# Patient Record
Sex: MALE | Race: WHITE | URBAN - METROPOLITAN AREA
[De-identification: names, ages, dates, MRNs, and addresses within clinical notes are randomized per-mention and may not be internally consistent; named-entity substitution may affect disease eponyms.]

---

## 2017-02-07 ENCOUNTER — OFFICE VISIT (OUTPATIENT)
Dept: DERMATOLOGY | Age: 77
End: 2017-02-07

## 2017-02-07 DIAGNOSIS — D48.5 NEOPLASM OF UNCERTAIN BEHAVIOR OF SKIN: ICD-10-CM

## 2017-02-07 DIAGNOSIS — L57.0 AK (ACTINIC KERATOSIS): Primary | ICD-10-CM

## 2017-02-07 PROCEDURE — 11100 PR BIOPSY OF SKIN LESION: CPT | Performed by: DERMATOLOGY

## 2017-02-07 PROCEDURE — 17003 DESTRUCT PREMALG LES 2-14: CPT | Performed by: DERMATOLOGY

## 2017-02-07 PROCEDURE — 17000 DESTRUCT PREMALG LESION: CPT | Performed by: DERMATOLOGY

## 2017-02-07 PROCEDURE — 1036F TOBACCO NON-USER: CPT | Performed by: DERMATOLOGY

## 2017-02-10 ENCOUNTER — TELEPHONE (OUTPATIENT)
Dept: DERMATOLOGY | Age: 77
End: 2017-02-10

## 2017-03-08 ENCOUNTER — OFFICE VISIT (OUTPATIENT)
Dept: DERMATOLOGY | Age: 77
End: 2017-03-08

## 2017-03-08 DIAGNOSIS — C44.612 BCC (BASAL CELL CARCINOMA), ARM, RIGHT: Primary | ICD-10-CM

## 2017-03-08 PROCEDURE — 17261 DSTRJ MAL LES T/A/L .6-1.0CM: CPT | Performed by: DERMATOLOGY

## 2017-03-08 PROCEDURE — 1036F TOBACCO NON-USER: CPT | Performed by: DERMATOLOGY

## 2017-08-09 ENCOUNTER — OFFICE VISIT (OUTPATIENT)
Dept: DERMATOLOGY | Age: 77
End: 2017-08-09

## 2017-08-09 DIAGNOSIS — Z85.828 HISTORY OF BASAL CELL CARCINOMA: ICD-10-CM

## 2017-08-09 DIAGNOSIS — L57.0 AK (ACTINIC KERATOSIS): ICD-10-CM

## 2017-08-09 DIAGNOSIS — S60.451A FOREIGN BODY OF LEFT INDEX FINGER: Primary | ICD-10-CM

## 2017-08-09 PROCEDURE — 3017F COLORECTAL CA SCREEN DOC REV: CPT | Performed by: DERMATOLOGY

## 2017-08-09 PROCEDURE — G8427 DOCREV CUR MEDS BY ELIG CLIN: HCPCS | Performed by: DERMATOLOGY

## 2017-08-09 PROCEDURE — 4040F PNEUMOC VAC/ADMIN/RCVD: CPT | Performed by: DERMATOLOGY

## 2017-08-09 PROCEDURE — 1036F TOBACCO NON-USER: CPT | Performed by: DERMATOLOGY

## 2017-08-09 PROCEDURE — 10120 INC&RMVL FB SUBQ TISS SMPL: CPT | Performed by: DERMATOLOGY

## 2017-08-09 PROCEDURE — 17000 DESTRUCT PREMALG LESION: CPT | Performed by: DERMATOLOGY

## 2017-08-09 PROCEDURE — 17003 DESTRUCT PREMALG LES 2-14: CPT | Performed by: DERMATOLOGY

## 2017-08-09 PROCEDURE — G8421 BMI NOT CALCULATED: HCPCS | Performed by: DERMATOLOGY

## 2017-08-09 PROCEDURE — 1123F ACP DISCUSS/DSCN MKR DOCD: CPT | Performed by: DERMATOLOGY

## 2017-08-09 PROCEDURE — 99213 OFFICE O/P EST LOW 20 MIN: CPT | Performed by: DERMATOLOGY

## 2018-02-12 ENCOUNTER — OFFICE VISIT (OUTPATIENT)
Dept: DERMATOLOGY | Age: 78
End: 2018-02-12

## 2018-02-12 DIAGNOSIS — L57.0 AK (ACTINIC KERATOSIS): Primary | ICD-10-CM

## 2018-02-12 DIAGNOSIS — Z85.828 HISTORY OF BASAL CELL CARCINOMA: ICD-10-CM

## 2018-02-12 DIAGNOSIS — D22.9 MULTIPLE NEVI: ICD-10-CM

## 2018-02-12 PROCEDURE — G8484 FLU IMMUNIZE NO ADMIN: HCPCS | Performed by: DERMATOLOGY

## 2018-02-12 PROCEDURE — G8421 BMI NOT CALCULATED: HCPCS | Performed by: DERMATOLOGY

## 2018-02-12 PROCEDURE — 4040F PNEUMOC VAC/ADMIN/RCVD: CPT | Performed by: DERMATOLOGY

## 2018-02-12 PROCEDURE — 17003 DESTRUCT PREMALG LES 2-14: CPT | Performed by: DERMATOLOGY

## 2018-02-12 PROCEDURE — 1123F ACP DISCUSS/DSCN MKR DOCD: CPT | Performed by: DERMATOLOGY

## 2018-02-12 PROCEDURE — G8427 DOCREV CUR MEDS BY ELIG CLIN: HCPCS | Performed by: DERMATOLOGY

## 2018-02-12 PROCEDURE — 3017F COLORECTAL CA SCREEN DOC REV: CPT | Performed by: DERMATOLOGY

## 2018-02-12 PROCEDURE — 17000 DESTRUCT PREMALG LESION: CPT | Performed by: DERMATOLOGY

## 2018-02-12 PROCEDURE — 1036F TOBACCO NON-USER: CPT | Performed by: DERMATOLOGY

## 2018-02-12 PROCEDURE — 99213 OFFICE O/P EST LOW 20 MIN: CPT | Performed by: DERMATOLOGY

## 2018-02-12 NOTE — PROGRESS NOTES
[facility]ermat[facility]  Kym Chapman MD  [phone]      Kristen Daft  1940    78 y.o. male     Date of Visit: 2/12/2018    Chief Complaint: skin lesions    History of Present Illness:    1. He complains of a rough scaly lesion on the forehead. He has a hx of AKs. 2.  He has multiple nevi and a hx of dysplastic nevus on the left flank - not aware of any changes in size, color or shape. 3.  He has a hx of BCCs most recently 1 year ago. Denies any signs of recurrence. Derm History:  History of AK'streated with Efudex on the left cheek/temple and ears and 2016.     Nodular BCC on the right distal extensor forearmtreated with curettage on 3/8/2017. Nodular BCC left lateral neckexcised on 9/30/2016.     Dysplastic nevus with moderate dysplasia on the left flank      Review of Systems:  Gen: Feels well, good sense of health. Skin: No new or changing moles, no history of keloids or hypertrophic scars. Heme: No abnormal bruising or bleeding. Past Medical History, Family History, Surgical History, Medications and Allergies reviewed.     Past Medical History:   Diagnosis Date    Hyperlipidemia     Hypertension      Past Surgical History:   Procedure Laterality Date    KNEE SURGERY         No Known Allergies  Outpatient Prescriptions Marked as Taking for the 2/12/18 encounter (Office Visit) with Davie Siddiqui MD   Medication Sig Dispense Refill    atorvastatin (LIPITOR) 20 MG tablet       losartan (COZAAR) 100 MG tablet       amLODIPine (NORVASC) 10 MG tablet Take 10 mg by mouth daily      omeprazole (PRILOSEC) 20 MG capsule Take 20 mg by mouth daily      vitamin D (CHOLECALCIFEROL) 1000 UNIT TABS tablet Take 1,000 Units by mouth daily      aspirin 81 MG chewable tablet Take 81 mg by mouth daily           Physical Examination       The following were examined and determined to be normal: Psych/Neuro, Scalp/hair, Conjunctivae/eyelids, Gums/teeth/lips, Neck,

## 2018-09-24 ENCOUNTER — OFFICE VISIT (OUTPATIENT)
Dept: DERMATOLOGY | Age: 78
End: 2018-09-24
Payer: MEDICARE

## 2018-09-24 DIAGNOSIS — D48.5 NEOPLASM OF UNCERTAIN BEHAVIOR OF SKIN: ICD-10-CM

## 2018-09-24 DIAGNOSIS — L57.0 AK (ACTINIC KERATOSIS): Primary | ICD-10-CM

## 2018-09-24 PROCEDURE — 11100 PR BIOPSY OF SKIN LESION: CPT | Performed by: DERMATOLOGY

## 2018-09-24 PROCEDURE — 17003 DESTRUCT PREMALG LES 2-14: CPT | Performed by: DERMATOLOGY

## 2018-09-24 PROCEDURE — 17000 DESTRUCT PREMALG LESION: CPT | Performed by: DERMATOLOGY

## 2018-09-24 NOTE — PROGRESS NOTES
appearing. 1.  Right central cheek - 2, right nasal tip - 1, right nasal sidewall - 1, right nasal ala - 1, dorsum of the R hand - 1: ill defined irreg shaped keratotic pink macules. 2.  Left upper abdomen - 5 mm variegated brown macule. Assessment and Plan     1. AK (actinic keratosis)     2 cycles of liquid nitrogen applied to 6 AKs: Right central cheek - 2, right nasal tip - 1, right nasal sidewall - 1, right nasal ala - 1, dorsum of the R hand - 1. Patient was educated regarding the potential risks of blister formation, discomfort, hypopigmentation, and scar. Wound care was discussed. 2. Neoplasm of uncertain behavior of skin, left upper abdomen - dysplastic nevus vs early melanoma    Discussed possible diagnosis; patient agreeable to biopsy (verbal consent obtained). The area(s) to be biopsied were marked with a surgical pen. Alcohol was used to cleanse the site. Local anesthesia was acheived with 1% lidocaine with epinephrine. Shave biopsy was performed using a razor blade. Hemostasis was achieved with aluminum chloride. The wound(s) were dressed with petrolatum and covered with a bandage. Wound care instructions were reviewed. 1 Specimen (s) sent to pathology. The specimen bottles were appropriately labeled. We also reviewed the risks of bleeding, scar, and infection. Return in about 6 months (around 3/24/2019).

## 2018-09-26 LAB — DERMATOLOGY PATHOLOGY REPORT: NORMAL

## 2019-03-28 ENCOUNTER — OFFICE VISIT (OUTPATIENT)
Dept: DERMATOLOGY | Age: 79
End: 2019-03-28
Payer: MEDICARE

## 2019-03-28 DIAGNOSIS — L57.0 ACTINIC KERATOSIS: ICD-10-CM

## 2019-03-28 DIAGNOSIS — L82.1 SEBORRHEIC KERATOSIS: ICD-10-CM

## 2019-03-28 DIAGNOSIS — L72.0 EPIDERMOID CYST: Primary | ICD-10-CM

## 2019-03-28 DIAGNOSIS — D22.9 MULTIPLE NEVI: ICD-10-CM

## 2019-03-28 PROCEDURE — 1123F ACP DISCUSS/DSCN MKR DOCD: CPT | Performed by: DERMATOLOGY

## 2019-03-28 PROCEDURE — 17000 DESTRUCT PREMALG LESION: CPT | Performed by: DERMATOLOGY

## 2019-03-28 PROCEDURE — 99213 OFFICE O/P EST LOW 20 MIN: CPT | Performed by: DERMATOLOGY

## 2019-03-28 PROCEDURE — G8484 FLU IMMUNIZE NO ADMIN: HCPCS | Performed by: DERMATOLOGY

## 2019-03-28 PROCEDURE — 4040F PNEUMOC VAC/ADMIN/RCVD: CPT | Performed by: DERMATOLOGY

## 2019-03-28 PROCEDURE — G8427 DOCREV CUR MEDS BY ELIG CLIN: HCPCS | Performed by: DERMATOLOGY

## 2019-03-28 PROCEDURE — 1036F TOBACCO NON-USER: CPT | Performed by: DERMATOLOGY

## 2019-03-28 PROCEDURE — G8421 BMI NOT CALCULATED: HCPCS | Performed by: DERMATOLOGY

## 2019-03-28 PROCEDURE — 17003 DESTRUCT PREMALG LES 2-14: CPT | Performed by: DERMATOLOGY

## 2019-10-01 ENCOUNTER — OFFICE VISIT (OUTPATIENT)
Dept: DERMATOLOGY | Age: 79
End: 2019-10-01
Payer: MEDICARE

## 2019-10-01 DIAGNOSIS — D48.5 NEOPLASM OF UNCERTAIN BEHAVIOR OF SKIN: Primary | ICD-10-CM

## 2019-10-01 DIAGNOSIS — L57.0 ACTINIC KERATOSIS: ICD-10-CM

## 2019-10-01 DIAGNOSIS — D22.9 MULTIPLE NEVI: ICD-10-CM

## 2019-10-01 DIAGNOSIS — Z85.828 HISTORY OF BASAL CELL CARCINOMA: ICD-10-CM

## 2019-10-01 PROCEDURE — 99213 OFFICE O/P EST LOW 20 MIN: CPT | Performed by: DERMATOLOGY

## 2019-10-01 PROCEDURE — G8421 BMI NOT CALCULATED: HCPCS | Performed by: DERMATOLOGY

## 2019-10-01 PROCEDURE — 11102 TANGNTL BX SKIN SINGLE LES: CPT | Performed by: DERMATOLOGY

## 2019-10-01 PROCEDURE — G8484 FLU IMMUNIZE NO ADMIN: HCPCS | Performed by: DERMATOLOGY

## 2019-10-01 PROCEDURE — G8427 DOCREV CUR MEDS BY ELIG CLIN: HCPCS | Performed by: DERMATOLOGY

## 2019-10-01 PROCEDURE — 1036F TOBACCO NON-USER: CPT | Performed by: DERMATOLOGY

## 2019-10-01 PROCEDURE — 4040F PNEUMOC VAC/ADMIN/RCVD: CPT | Performed by: DERMATOLOGY

## 2019-10-01 PROCEDURE — 1123F ACP DISCUSS/DSCN MKR DOCD: CPT | Performed by: DERMATOLOGY

## 2019-10-01 RX ORDER — METOPROLOL SUCCINATE 50 MG/1
50 TABLET, EXTENDED RELEASE ORAL
COMMUNITY
Start: 2018-12-20

## 2019-10-03 LAB — DERMATOLOGY PATHOLOGY REPORT: ABNORMAL

## 2019-11-01 ENCOUNTER — PROCEDURE VISIT (OUTPATIENT)
Dept: DERMATOLOGY | Age: 79
End: 2019-11-01
Payer: MEDICARE

## 2019-11-01 DIAGNOSIS — C44.41 BASAL CELL CARCINOMA (BCC) OF SCALP: Primary | ICD-10-CM

## 2019-11-01 PROCEDURE — 17270 DSTR MAL LES S/N/H/F/G .5 /<: CPT | Performed by: DERMATOLOGY

## 2020-09-01 ENCOUNTER — OFFICE VISIT (OUTPATIENT)
Dept: DERMATOLOGY | Age: 80
End: 2020-09-01
Payer: MEDICARE

## 2020-09-01 PROCEDURE — 4040F PNEUMOC VAC/ADMIN/RCVD: CPT | Performed by: DERMATOLOGY

## 2020-09-01 PROCEDURE — 17000 DESTRUCT PREMALG LESION: CPT | Performed by: DERMATOLOGY

## 2020-09-01 PROCEDURE — 1123F ACP DISCUSS/DSCN MKR DOCD: CPT | Performed by: DERMATOLOGY

## 2020-09-01 PROCEDURE — 99213 OFFICE O/P EST LOW 20 MIN: CPT | Performed by: DERMATOLOGY

## 2020-09-01 PROCEDURE — G8427 DOCREV CUR MEDS BY ELIG CLIN: HCPCS | Performed by: DERMATOLOGY

## 2020-09-01 PROCEDURE — 17003 DESTRUCT PREMALG LES 2-14: CPT | Performed by: DERMATOLOGY

## 2020-09-01 PROCEDURE — 1036F TOBACCO NON-USER: CPT | Performed by: DERMATOLOGY

## 2020-09-01 PROCEDURE — G8421 BMI NOT CALCULATED: HCPCS | Performed by: DERMATOLOGY

## 2020-09-01 NOTE — PROGRESS NOTES
[facility] Dermat[facility]  Altagracia Rangel MD  [phone]      Unique Issa  1940    68 y.o. male     Date of Visit: 9/1/2020    Chief Complaint: skin lesions    History of Present Illness:    1. Follow-up for history of actinic keratoses-complains of a couple of persistent scaly lesions on the nose. 2.  He also complains of 2 persistent lesions on the right forearm. 3.  Complains of an asymptomatic lesion on the right upper back. 4.  He has multiple nevi on the trunk and extremities-not aware of any changes in size, color, or shape. 5.  He has a history of BCCs-denies any signs of recurrence. Superficial BCC on the central occipital scalp-treated with curettage on 11/1/2019. Nodular BCC on the right distal extensor forearm-treated with curettage on 3/8/2017. Nodular BCC left lateral neck-excised on 9/30/2016. Derm History:  History of AK's-treated with Efudex on the left cheek/temple and ears and 2016.     Moderately dysplastic nevus - left upper abdomen, 9/24/18  Dysplastic nevus with moderate dysplasia on the left flank      On Eliquis. Review of Systems:  Skin: No rash or other concerning skin lesions. Past Medical History, Family History, Surgical History, Medications and Allergies reviewed.     Past Medical History:   Diagnosis Date    A-fib ([address]     Hyperlipidemia     Hypertension      Past Surgical History:   Procedure Laterality Date    KNEE SURGERY         No Known Allergies  Outpatient Medications Marked as Taking for the 9/1/20 encounter (Office Visit) with Rocio Matos MD   Medication Sig Dispense Refill    Boswellia-Glucosamine-Vit D (GLUCOSAMINE COMPLEX PO) Take by mouth      metoprolol succinate (TOPROL XL) 50 MG extended release tablet Take 50 mg by mouth      apixaban (ELIQUIS) 5 MG TABS tablet Take 5 mg by mouth      atorvastatin (LIPITOR) 20 MG tablet       losartan (COZAAR) 100 MG tablet       vitamin D (CHOLECALCIFEROL) 1000 UNIT TABS tablet Take 1,000 Units by mouth daily           Physical Examination       The following were examined and determined to be normal: Psych/Neuro, Scalp/hair, Conjunctivae/eyelids, Gums/teeth/lips, Neck, Breast/axilla/chest, Abdomen, Back, RUE and LUE. The following were examined and determined to be abnormal: Head/face. Well-appearing. 1.  Nasal tip with 3 keratotic erythematous macules. 2.  Right forearm with 2 stuck on appearing verrucous pink-brown papules. 3.  Right upper back with a small round skin colored nodule with overlying punctum. 4.  Scattered on the trunk and extremities are multiple well-defined round oval uniformly brown macules and few papules. 5.  Clear. Assessment and Plan     1. AK (actinic keratosis) - 3    2 cycles of liquid nitrogen applied to 3 AKs on the nose. Patient was educated regarding the potential risks of blister formation, discomfort, hypopigmentation, and scar. Wound care was discussed. 2. SK (seborrheic keratosis)     Reassurance. 3. Epidermoid cyst on the back - small and asymptomatic    Reassurance. 4. Multiple nevi - benign appearing    Sun protective behaviors and self skin examinations were encouraged. Call for any new or concerning lesions. 5. History of basal cell carcinomas - clear    Sun protective behaviors and self skin examinations were encouraged. Call for any new or concerning lesions. Return in about 6 months (around 3/1/2021).

## 2020-12-28 ENCOUNTER — TELEPHONE (OUTPATIENT)
Dept: DERMATOLOGY | Age: 80
End: 2020-12-28

## 2020-12-28 NOTE — TELEPHONE ENCOUNTER
The patient called about dry scaly skin on back that comes back every winter, it itches very bad. He also has a problem with a cyst on back that Dr. Sandrine San has seen before. It is bothering him and is burning and he is ready to schedule treatment. Please call with advise/scheduling for both issues.      Ph. [phone] [facility] in [place]

## 2020-12-28 NOTE — TELEPHONE ENCOUNTER
Patient scheduled for cyst removal.     Previous treatment included triamcinolone and if any suggestions for daily moisturizer?

## 2020-12-29 RX ORDER — TRIAMCINOLONE ACETONIDE 1 MG/G
CREAM TOPICAL
Qty: 80 G | Refills: 0 | Status: SHIPPED | OUTPATIENT
Start: 2020-12-29

## 2020-12-29 NOTE — TELEPHONE ENCOUNTER
I can send in order for triamcinolone to use twice per day to any itchy areas until improved. For a daily moisturizer: Cetaphil or CeraVe cream (in the jar) or Eucerin advanced repair (in the jar).      Thanks,    [facility]

## 2021-01-29 ENCOUNTER — PROCEDURE VISIT (OUTPATIENT)
Dept: DERMATOLOGY | Age: 81
End: 2021-01-29
Payer: MEDICARE

## 2021-01-29 VITALS — TEMPERATURE: 97.2 F

## 2021-01-29 DIAGNOSIS — L72.0 EPIDERMOID CYST: Primary | ICD-10-CM

## 2021-01-29 DIAGNOSIS — R20.9 DISTURBANCE OF SKIN SENSATION: ICD-10-CM

## 2021-01-29 PROCEDURE — 11401 EXC TR-EXT B9+MARG 0.6-1 CM: CPT | Performed by: DERMATOLOGY

## 2021-01-29 NOTE — PROGRESS NOTES
[facility]  Marya Wong MD  [phone]      Kenji Been  1940    68 y.o. male     Date of Visit: 1/29/2021    Chief Complaint: cyst    History of Present Illness:    He presents today for removal of a cyst on the right upper back that is painful. He reports that he experiences the pain mainly after golfing. On Eliquis. Review of Systems:  Gen: Feels well, good sense of health. Heme: No abnormal bruising or bleeding. Past Medical History, Family History, Surgical History, Medications and Allergies reviewed. Past Medical History:   Diagnosis Date    [address] [address]     Hyperlipidemia     Hypertension      Past Surgical History:   Procedure Laterality Date    KNEE SURGERY         No Known Allergies  Outpatient Medications Marked as Taking for the 1/29/21 encounter (Procedure visit) with Anna Joseph MD   Medication Sig Dispense Refill    triamcinolone (KENALOG) 0.1 % cream Apply topically 2 times daily for up to 2 weeks or until improved. 80 g 0    Boswellia-Glucosamine-Vit D (GLUCOSAMINE COMPLEX PO) Take by mouth      metoprolol succinate (TOPROL XL) 50 MG extended release tablet Take 50 mg by mouth      apixaban (ELIQUIS) 5 MG TABS tablet Take 5 mg by mouth      atorvastatin (LIPITOR) 20 MG tablet       losartan (COZAAR) 100 MG tablet       vitamin D (CHOLECALCIFEROL) 1000 UNIT TABS tablet Take 1,000 Units by mouth daily           Physical Examination       Well appearing. 1.  Right upper back with an 8 mm round skin colored nodule with overlying punctum. Assessment and Plan     1. Painful epidermoid cyst on the right upper back -     The site to be treated was confirmed with the patient and the previous note. The patient was educated regarding potential risks of bleeding, discomfort, scar, and recurrence. A consent form was signed by the patient. The area was prepped and draped in the normal sterile fashion.  Local anesthesia was obtained [facility] 1% lidocaine with epinephrine. An incision was performed overlying the cyst a 8 mm punch tool down to subcutaneous fat, the cyst was visualized and dissected from the surrounding tissue. The specimen was submitted to pathology in an appropriately labeled specimen container. Pinpoint hemostasis was obtained. The wound edges were approximated with simple interrupted sutures of  4-0 nylon . Blood loss was minimal and there were no immediate complications. The wound was covered with petrolatum and a pressure bandage. The patient is to return in 14 days for suture removal.           Return in about 2 weeks (around 2/12/2021).     --Matt Morin MD

## 2021-01-29 NOTE — PATIENT INSTRUCTIONS

## 2021-02-11 ENCOUNTER — NURSE ONLY (OUTPATIENT)
Dept: DERMATOLOGY | Age: 81
End: 2021-02-11

## 2021-02-11 VITALS — TEMPERATURE: 97.1 F

## 2021-02-11 DIAGNOSIS — Z48.02 ENCOUNTER FOR REMOVAL OF SUTURES: Primary | ICD-10-CM

## 2021-02-11 PROCEDURE — 99024 POSTOP FOLLOW-UP VISIT: CPT | Performed by: DERMATOLOGY

## 2021-03-24 ENCOUNTER — OFFICE VISIT (OUTPATIENT)
Dept: DERMATOLOGY | Age: 81
End: 2021-03-24
Payer: MEDICARE

## 2021-03-24 VITALS — TEMPERATURE: 97.6 F

## 2021-03-24 DIAGNOSIS — L57.0 AK (ACTINIC KERATOSIS): Primary | ICD-10-CM

## 2021-03-24 DIAGNOSIS — C44.519 BASAL CELL CARCINOMA (BCC) OF BACK: ICD-10-CM

## 2021-03-24 PROCEDURE — 17261 DSTRJ MAL LES T/A/L .6-1.0CM: CPT | Performed by: DERMATOLOGY

## 2021-03-24 PROCEDURE — 17000 DESTRUCT PREMALG LESION: CPT | Performed by: DERMATOLOGY

## 2021-03-24 PROCEDURE — 17003 DESTRUCT PREMALG LES 2-14: CPT | Performed by: DERMATOLOGY

## 2021-03-24 NOTE — PATIENT INSTRUCTIONS
Biopsy Wound Care Instructions    · Keep the bandage in place for 24 hours. · Cleanse the wound with mild soapy water daily   Gently dry the area.  Apply Vaseline or petroleum jelly to the wound using a cotton tipped applicator.  Cover with a clean bandage.  Repeat this process until the biopsy site is healed.  If you had stitches placed, continue treating the site until the stitches are removed. Remember to make an appointment to return to have your stitches removed by our staff.  You may shower and bathe as usual.       ** Biopsy results generally take around 7 business days to come back. If you have not heard from us by then, please call the office at [phone]. *Please note that biopsy results are released to both the patient and physician at the same time in [address] Please allow time for your physician to review the results. One of our staff members will reach out to you with the results and plan.

## 2021-03-24 NOTE — PROGRESS NOTES
[facility] Dermatology  Silvestre Gann MD  [phone]      Tete Silverio  1940    68 y.o. male     Date of Visit: 3/24/2021    Chief Complaint: skin lesions    History of Present Illness:    1. Follow-up for history of AK's-has several new lesions on the left ear and right wrist.    2.  Unknown duration of a persistent pink lesion on the right mid upper back. Dermatologic history:    Superficial BCC on the central occipital scalp-treated with curettage on 11/1/2019. Nodular BCC on the right distal extensor forearm-treated with curettage on 3/8/2017. Nodular BCC left lateral neck-excised on 9/30/2016.     History of AK's-treated with Efudex on the left cheek/temple and ears and 2016.     Moderately dysplastic nevus - left upper abdomen, 9/24/18  Dysplastic nevus with moderate dysplasia on the left flank    On Eliquis. Review of Systems:  Gen: Feels well, good sense of health. Past Medical History, Family History, Surgical History, Medications and Allergies reviewed. Past Medical History:   Diagnosis Date    A-fib ([address]     Hyperlipidemia     Hypertension      Past Surgical History:   Procedure Laterality Date    KNEE SURGERY         No Known Allergies  Outpatient Medications Marked as Taking for the 3/24/21 encounter (Office Visit) with Kayleen Young MD   Medication Sig Dispense Refill    triamcinolone (KENALOG) 0.1 % cream Apply topically 2 times daily for up to 2 weeks or until improved.  80 g 0    Boswellia-Glucosamine-Vit D (GLUCOSAMINE COMPLEX PO) Take by mouth      metoprolol succinate (TOPROL XL) 50 MG extended release tablet Take 50 mg by mouth      apixaban (ELIQUIS) 5 MG TABS tablet Take 5 mg by mouth      atorvastatin (LIPITOR) 20 MG tablet       losartan (COZAAR) 100 MG tablet       vitamin D (CHOLECALCIFEROL) 1000 UNIT TABS tablet Take 1,000 Units by mouth daily           Physical Examination       The following were examined and determined to be normal: Psych/Neuro, Scalp/hair, Conjunctivae/eyelids, Gums/teeth/lips, Neck, Breast/axilla/chest, Abdomen and LUE. The following were examined and determined to be abnormal: Head/face, Back and RUE. Well-appearing. 1.  Right lateral wrist-1, helix of the left ear-3: Several keratotic erythematous macules. 2.  Right mid upper back with a 6 mm telangiectatic pink macule. Assessment and Plan     1. AK (actinic keratosis) - several    2 cycles of liquid nitrogen applied to 4 AKs: Right lateral wrist-1, helix of the left ear-3. Patient was educated regarding the potential risks of blister formation, discomfort, hypopigmentation, and scar. Wound care was discussed. 2. Small nodular and superficial basal cell carcinoma (BCC) of back, right mid upper - 6 mm    Discussed likely diagnosis; patient agreeable to biopsy and curettage (verbal consent obtained). The area(s) to be biopsied were marked with a surgical pen. Alcohol was used to cleanse the site. Local anesthesia was acheived with 1% lidocaine with epinephrine. Shave biopsy was performed using a razor blade followed by sharp curettage in multiple directions. Hemostasis was achieved with electrodesiccation. The wound(s) were dressed with petrolatum and covered with a bandage. Wound care instructions were reviewed. 1 Specimen (s) sent to pathology. The specimen bottles were appropriately labeled. We also reviewed the risks of bleeding, scar, and infection. Return in about 6 months (around 9/24/2021).     --Antoine Nguyen MD

## 2021-03-26 LAB — DERMATOLOGY PATHOLOGY REPORT: ABNORMAL

## 2021-09-29 ENCOUNTER — OFFICE VISIT (OUTPATIENT)
Dept: DERMATOLOGY | Age: 81
End: 2021-09-29
Payer: MEDICARE

## 2021-09-29 VITALS — TEMPERATURE: 97.4 F

## 2021-09-29 DIAGNOSIS — L57.0 AK (ACTINIC KERATOSIS): Primary | ICD-10-CM

## 2021-09-29 PROCEDURE — 17000 DESTRUCT PREMALG LESION: CPT | Performed by: DERMATOLOGY

## 2021-09-29 PROCEDURE — 17003 DESTRUCT PREMALG LES 2-14: CPT | Performed by: DERMATOLOGY

## 2021-09-29 NOTE — PROGRESS NOTES
[facility] Dermat[facility]  Chadd Christianson MD  [phone]      Kendra Desir  1940    80 y.o. male     Date of Visit: 9/29/2021    Chief Complaint: skin lesions    History of Present Illness:    1. He complains of several persistent scaly lesions on the upper extremities and face. Dermatologic history:  Small nodular BCC on the right mid upper back-treated with electrodesiccation and curettage on 3/24/2021. Superficial BCC on the central occipital scalp-treated with curettage on 11/1/2019. Nodular BCC on the right distal extensor forearm-treated with curettage on 3/8/2017. Nodular BCC left lateral neck-excised on 9/30/2016.     History of AK's-treated with Efudex on the left cheek/temple and ears and 2016.     Moderately dysplastic nevus - left upper abdomen, 9/24/18  Dysplastic nevus with moderate dysplasia on the left flank     On Eliquis.       Review of Systems:  Gen: Feels well, good sense of health. Past Medical History, Family History, Surgical History, Medications and Allergies reviewed. Past Medical History:   Diagnosis Date    [facility] [address] [address]     Hyperlipidemia     Hypertension      Past Surgical History:   Procedure Laterality Date    KNEE SURGERY         No Known Allergies  Outpatient Medications Marked as Taking for the 9/29/21 encounter (Office Visit) with Trisha Parkinson MD   Medication Sig Dispense Refill    triamcinolone (KENALOG) 0.1 % cream Apply topically 2 times daily for up to 2 weeks or until improved.  80 g 0    Boswellia-Glucosamine-Vit D (GLUCOSAMINE COMPLEX PO) Take by mouth      metoprolol succinate (TOPROL XL) 50 MG extended release tablet Take 50 mg by mouth      apixaban (ELIQUIS) 5 MG TABS tablet Take 5 mg by mouth      atorvastatin (LIPITOR) 20 MG tablet       losartan (COZAAR) 100 MG tablet       vitamin D (CHOLECALCIFEROL) 1000 UNIT TABS tablet Take 1,000 Units by mouth daily             Physical Examination       The following were examined and determined to be normal: Psych/Neuro, Scalp/hair, Conjunctivae/eyelids, Gums/teeth/lips, Neck, Breast/axilla/chest, Abdomen and Back. The following were examined and determined to be abnormal: Head/face, RUE and LUE. Well appearing. 1.  Right forearm - 1, left forearm - 1, right helix - 3, right medial cheek - 1, nasal dorsum - 1, left superior helix - 1: ill defined keratotic pink macules. Assessment and Plan     1. AK (actinic keratosis) - several    2 cycles of liquid nitrogen applied to 8 AKs: Right forearm - 1, left forearm - 1, right helix - 3, right medial cheek - 1, nasal dorsum - 1, left superior helix - 1. Patient was educated regarding the potential risks of blister formation and discomfort. Wound care was discussed. Return in about 6 months (around 3/29/2022).     --Gavi Cárdenas MD

## 2022-04-06 ENCOUNTER — OFFICE VISIT (OUTPATIENT)
Dept: DERMATOLOGY | Age: 82
End: 2022-04-06
Payer: MEDICARE

## 2022-04-06 VITALS — TEMPERATURE: 97.1 F

## 2022-04-06 DIAGNOSIS — L30.0 NUMMULAR DERMATITIS: ICD-10-CM

## 2022-04-06 DIAGNOSIS — L57.0 AK (ACTINIC KERATOSIS): Primary | ICD-10-CM

## 2022-04-06 PROCEDURE — G8421 BMI NOT CALCULATED: HCPCS | Performed by: DERMATOLOGY

## 2022-04-06 PROCEDURE — 1036F TOBACCO NON-USER: CPT | Performed by: DERMATOLOGY

## 2022-04-06 PROCEDURE — G8427 DOCREV CUR MEDS BY ELIG CLIN: HCPCS | Performed by: DERMATOLOGY

## 2022-04-06 PROCEDURE — 99213 OFFICE O/P EST LOW 20 MIN: CPT | Performed by: DERMATOLOGY

## 2022-04-06 PROCEDURE — 17000 DESTRUCT PREMALG LESION: CPT | Performed by: DERMATOLOGY

## 2022-04-06 PROCEDURE — 17003 DESTRUCT PREMALG LES 2-14: CPT | Performed by: DERMATOLOGY

## 2022-04-06 PROCEDURE — 4040F PNEUMOC VAC/ADMIN/RCVD: CPT | Performed by: DERMATOLOGY

## 2022-04-06 PROCEDURE — 1123F ACP DISCUSS/DSCN MKR DOCD: CPT | Performed by: DERMATOLOGY

## 2022-04-06 NOTE — PROGRESS NOTES
[facility] Dermatology  Joon Mix MD  [phone]      Betty Sanchez  1940    80 y.o. male     Date of Visit: 4/6/2022    Chief Complaint: skin lesions    History of Present Illness:    1. Follow-up for history of AK's-has few new lesions on the face today. 2.  He also complains of some itching on the upper portion of the back. He has a history of dermatitis and used triamcinolone 0.1% cream in the past      Dermatologic history:  Small nodular BCC on the right mid upper back-treated with electrodesiccation and curettage on 3/24/2021. Superficial BCC on the central occipital scalp-treated with curettage on 11/1/2019. Nodular BCC on the right distal extensor forearm-treated with curettage on 3/8/2017. Nodular BCC left lateral neck-excised on 9/30/2016.     History of AK's-treated with Efudex on the left cheek/temple and ears and 2016.     Moderately dysplastic nevus - left upper abdomen, 9/24/18  Dysplastic nevus with moderate dysplasia on the left flank     On Eliquis. Review of Systems:  Gen: Feels well, good sense of health. Skin: No new or changing moles. Past Medical History, Family History, Surgical History, Medications and Allergies reviewed. Past Medical History:   Diagnosis Date    A-fib ([address]     Hyperlipidemia     Hypertension      Past Surgical History:   Procedure Laterality Date    KNEE SURGERY         No Known Allergies  Outpatient Medications Marked as Taking for the 4/6/22 encounter (Office Visit) with Karo Munoz MD   Medication Sig Dispense Refill    triamcinolone (KENALOG) 0.1 % cream Apply topically 2 times daily for up to 2 weeks or until improved.  80 g 0    Boswellia-Glucosamine-Vit D (GLUCOSAMINE COMPLEX PO) Take by mouth      metoprolol succinate (TOPROL XL) 50 MG extended release tablet Take 50 mg by mouth      apixaban (ELIQUIS) 5 MG TABS tablet Take 5 mg by mouth      atorvastatin (LIPITOR) 20 MG tablet       losartan (COZAAR) 100 MG tablet       vitamin D (CHOLECALCIFEROL) 1000 UNIT TABS tablet Take 1,000 Units by mouth daily           Physical Examination       The following were examined and determined to be normal: Psych/Neuro, Scalp/hair, Conjunctivae/eyelids, Gums/teeth/lips, Neck, Breast/axilla/chest, Abdomen, RUE and LUE. The following were examined and determined to be abnormal: Head/face and Back. Well appearing. 1.  Right lower forehead - 1, left upper forehead - 1, nasal dorsum - 3, left cheek - 1: ill defined keratotic pink macules. 2.  Mid upper back - round crusted and scaly erythematous patches. Assessment and Plan     1. AK (actinic keratosis) - several    2 cycles of liquid nitrogen applied to 6 AKs: Right lower forehead - 1, left upper forehead - 1, nasal dorsum - 3, left cheek - 1. Patient was educated regarding the potential risks of blister formation and discomfort. Wound care was discussed. 2. 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 Return in about 6 months (around 10/6/2022).     --Mohini Matias MD

## 2022-11-02 ENCOUNTER — OFFICE VISIT (OUTPATIENT)
Dept: DERMATOLOGY | Age: 82
End: 2022-11-02
Payer: MEDICARE

## 2022-11-02 DIAGNOSIS — L57.0 AK (ACTINIC KERATOSIS): Primary | ICD-10-CM

## 2022-11-02 DIAGNOSIS — D22.9 MULTIPLE NEVI: ICD-10-CM

## 2022-11-02 DIAGNOSIS — L30.0 NUMMULAR DERMATITIS: ICD-10-CM

## 2022-11-02 PROCEDURE — 1123F ACP DISCUSS/DSCN MKR DOCD: CPT | Performed by: DERMATOLOGY

## 2022-11-02 PROCEDURE — G8484 FLU IMMUNIZE NO ADMIN: HCPCS | Performed by: DERMATOLOGY

## 2022-11-02 PROCEDURE — 1036F TOBACCO NON-USER: CPT | Performed by: DERMATOLOGY

## 2022-11-02 PROCEDURE — G8427 DOCREV CUR MEDS BY ELIG CLIN: HCPCS | Performed by: DERMATOLOGY

## 2022-11-02 PROCEDURE — 17000 DESTRUCT PREMALG LESION: CPT | Performed by: DERMATOLOGY

## 2022-11-02 PROCEDURE — 99213 OFFICE O/P EST LOW 20 MIN: CPT | Performed by: DERMATOLOGY

## 2022-11-02 PROCEDURE — G8421 BMI NOT CALCULATED: HCPCS | Performed by: DERMATOLOGY

## 2022-11-02 PROCEDURE — 17003 DESTRUCT PREMALG LES 2-14: CPT | Performed by: DERMATOLOGY

## 2022-11-02 NOTE — PROGRESS NOTES
[facility]  Huma York MD  [phone]      Delia Sow  1940    [de-identified] y.o. male     Date of Visit: 11/2/2022    Chief Complaint: skin lesions    History of Present Illness:    1. Follow up for a history of AKs - complains of few lesions on the nose. 2.  Has hx of nummular dermatitis on the back - quiescent right now. Recurrences improve with triamcinolone 0.1% cream.      3.  He has multiple nevi - not aware of any changes in size, color or shape. Dermatologic history:  Small nodular BCC on the right mid upper back-treated with electrodesiccation and curettage on 3/24/2021. Superficial BCC on the central occipital scalp-treated with curettage on 11/1/2019. Nodular BCC on the right distal extensor forearm-treated with curettage on 3/8/2017. Nodular BCC left lateral neck-excised on 9/30/2016. History of AK's-treated with Efudex on the left cheek/temple and ears and 2016. Moderately dysplastic nevus - left upper abdomen, 9/24/18  Dysplastic nevus with moderate dysplasia on the left flank     On Eliquis. Review of Systems:  Gen: Feels well, good sense of health. Past Medical History, Family History, Surgical History, Medications and Allergies reviewed. Past Medical History:   Diagnosis Date    A-fib ([address]     Hyperlipidemia     Hypertension      Past Surgical History:   Procedure Laterality Date    KNEE SURGERY         No Known Allergies  Outpatient Medications Marked as Taking for the 11/2/22 encounter (Office Visit) with Idalia Morse MD   Medication Sig Dispense Refill    triamcinolone (KENALOG) 0.1 % cream Apply topically 2 times daily for up to 2 weeks or until improved.  80 g 0    Boswellia-Glucosamine-Vit D (GLUCOSAMINE COMPLEX PO) Take by mouth      metoprolol succinate (TOPROL XL) 50 MG extended release tablet Take 50 mg by mouth      apixaban (ELIQUIS) 5 MG TABS tablet Take 5 mg by mouth      atorvastatin (LIPITOR) 20 MG tablet losartan (COZAAR) 100 MG tablet       vitamin D (CHOLECALCIFEROL) 1000 UNIT TABS tablet Take 1,000 Units by mouth daily           Physical Examination       The following were examined and determined to be normal: Psych/Neuro, Scalp/hair, Conjunctivae/eyelids, Gums/teeth/lips, Neck, Breast/axilla/chest, Abdomen, Back, RUE, and LUE. The following were examined and determined to be abnormal: Head/face. Well appearing. 1.  Dorsum of the right middle finger - 1, left proximal nasal dorsum - 1, left medial cheek - 1, left earlobe - 1: ill defined keratotic pink macules. 2.  Back clear. 3.  Trunk and extremities with multiple well defined round to oval smooth brown macules and papules. Assessment and Plan     1. AK (actinic keratosis) - 4    Cryotherapy was discussed and patient agreed to proceed. Consent was obtained. 4 lesions were treated cryotherapy: Dorsum of the right middle finger - 1, left proximal nasal dorsum - 1, left medial cheek - 1, left earlobe - 1. 2 cycles of liquid nitrogen applied to each lesion for 5 seconds using a [employer]LE[employer]-Ac cryo spray gun. Patient was educated regarding the potential risks of blister formation and discomfort. Wound care was discussed. The patient tolerated the procedure well and there were no immediate complications. 2. Nummular dermatitis - quiescent    Triamcinolone 0.1% cream twice daily as needed for recurrences. 3. Multiple nevi - benign appearing    Sun protective behaviors, including use of at least SPF 30 sunscreen, and self skin examinations were encouraged. Call for any new or concerning lesions. Return in about 6 months (around 5/2/2023).     --Foy Romberg, MD

## 2023-05-11 ENCOUNTER — OFFICE VISIT (OUTPATIENT)
Dept: DERMATOLOGY | Age: 83
End: 2023-05-11
Payer: MEDICARE

## 2023-05-11 DIAGNOSIS — L57.0 AK (ACTINIC KERATOSIS): Primary | ICD-10-CM

## 2023-05-11 DIAGNOSIS — D22.9 MULTIPLE NEVI: ICD-10-CM

## 2023-05-11 PROCEDURE — 17003 DESTRUCT PREMALG LES 2-14: CPT | Performed by: DERMATOLOGY

## 2023-05-11 PROCEDURE — 1123F ACP DISCUSS/DSCN MKR DOCD: CPT | Performed by: DERMATOLOGY

## 2023-05-11 PROCEDURE — G8421 BMI NOT CALCULATED: HCPCS | Performed by: DERMATOLOGY

## 2023-05-11 PROCEDURE — 1036F TOBACCO NON-USER: CPT | Performed by: DERMATOLOGY

## 2023-05-11 PROCEDURE — 17000 DESTRUCT PREMALG LESION: CPT | Performed by: DERMATOLOGY

## 2023-05-11 PROCEDURE — G8427 DOCREV CUR MEDS BY ELIG CLIN: HCPCS | Performed by: DERMATOLOGY

## 2023-05-11 PROCEDURE — 99212 OFFICE O/P EST SF 10 MIN: CPT | Performed by: DERMATOLOGY

## 2023-05-11 RX ORDER — AMIODARONE HYDROCHLORIDE 200 MG/1
200 TABLET ORAL DAILY
COMMUNITY
Start: 2023-04-05

## 2023-05-11 NOTE — PROGRESS NOTES
[facility]  Cora Madden MD  [phone]      Carlos Raya  1940    [de-identified] y.o. male     Date of Visit: 5/11/2023    Chief Complaint: skin lesions    History of Present Illness:    1. He returns today to follow-up for history of actinic keratoses-has several new lesions on the right ear and upper extremities today. 2.  He has multiple moles on the trunk and extremities-not aware of any changes in size, color, or shape. He has a history of nummular dermatitis-treated with triamcinolone 0.1% cream.      Dermatologic history:  Small nodular BCC on the right mid upper back-treated with electrodesiccation and curettage on 3/24/2021. Superficial BCC on the central occipital scalp-treated with curettage on 11/1/2019. Nodular BCC on the right distal extensor forearm-treated with curettage on 3/8/2017. Nodular BCC left lateral neck-excised on 9/30/2016. History of AK's-treated with Efudex on the left cheek/temple and ears and 2016. Moderately dysplastic nevus - left upper abdomen, 9/24/18  Dysplastic nevus with moderate dysplasia on the left flank     On Eliquis. Review of Systems:  Gen: Feels well, good sense of health. Past Medical History, Family History, Surgical History, Medications and Allergies reviewed. Past Medical History:   Diagnosis Date    A-fib ([facility] [address]     Hyperlipidemia     Hypertension      Past Surgical History:   Procedure Laterality Date    KNEE SURGERY         No Known Allergies  Outpatient Medications Marked as Taking for the 5/11/23 encounter (Office Visit) with Erin Salmeron MD   Medication Sig Dispense Refill    amiodarone (CORDARONE) 200 MG tablet Take 1 tablet by mouth daily      triamcinolone (KENALOG) 0.1 % cream Apply topically 2 times daily for up to 2 weeks or until improved.  80 g 0    Boswellia-Glucosamine-Vit D (GLUCOSAMINE COMPLEX PO) Take by mouth      metoprolol succinate (TOPROL XL) 50 MG extended release tablet Take 1 tablet

## 2023-10-02 ENCOUNTER — TELEPHONE (OUTPATIENT)
Dept: DERMATOLOGY | Age: 83
End: 2023-10-02

## 2023-10-02 NOTE — TELEPHONE ENCOUNTER
Called and left message for patient to call back office. Can move up appt to 10/11 if still available.

## 2023-10-02 NOTE — TELEPHONE ENCOUNTER
Pt has a spot on his nose that is crusty looking. He has had it for three weeks and he would like it looked at. He has an appt on 11/8/23 but he would like to come in sooner. [phone]   Out of town this week. Was hoping to get in next week if possible.

## 2023-10-11 ENCOUNTER — OFFICE VISIT (OUTPATIENT)
Dept: DERMATOLOGY | Age: 83
End: 2023-10-11
Payer: MEDICARE

## 2023-10-11 DIAGNOSIS — L57.0 AK (ACTINIC KERATOSIS): Primary | ICD-10-CM

## 2023-10-11 PROCEDURE — 17000 DESTRUCT PREMALG LESION: CPT | Performed by: DERMATOLOGY

## 2023-10-11 PROCEDURE — 17003 DESTRUCT PREMALG LES 2-14: CPT | Performed by: DERMATOLOGY

## 2023-10-11 NOTE — PROGRESS NOTES
[facility]  Samantha Bowles MD  [phone]      Lewis Carrel  1940    82 y.o. male     Date of Visit: 10/11/2023    Chief Complaint: skin lesions    History of Present Illness:    He presents today for several persistent scaly lesions on the face. Dermatologic history:  Small nodular BCC on the right mid upper back-treated with electrodesiccation and curettage on 3/24/2021. Superficial BCC on the central occipital scalp-treated with curettage on 11/1/2019. Nodular BCC on the right distal extensor forearm-treated with curettage on 3/8/2017. Nodular BCC left lateral neck-excised on 9/30/2016. History of AK's-treated with Efudex on the left cheek/temple and ears and 2016. Moderately dysplastic nevus - left upper abdomen, 9/24/18  Dysplastic nevus with moderate dysplasia on the left flank     On Eliquis. Review of Systems:  Gen: Feels well, good sense of health. Past Medical History, Family History, Surgical History, Medications and Allergies reviewed. Past Medical History:   Diagnosis Date    A-fib ([address])     Hyperlipidemia     Hypertension      Past Surgical History:   Procedure Laterality Date    KNEE SURGERY         No Known Allergies  Outpatient Medications Marked as Taking for the 10/11/23 encounter (Office Visit) with Barb Ridley MD   Medication Sig Dispense Refill    amiodarone (CORDARONE) 200 MG tablet Take 1 tablet by mouth daily      triamcinolone (KENALOG) 0.1 % cream Apply topically 2 times daily for up to 2 weeks or until improved.  80 g 0    Boswellia-Glucosamine-Vit D (GLUCOSAMINE COMPLEX PO) Take by mouth      metoprolol succinate (TOPROL XL) 50 MG extended release tablet Take 1 tablet by mouth      apixaban (ELIQUIS) 5 MG TABS tablet Take 1 tablet by mouth      atorvastatin (LIPITOR) 20 MG tablet       losartan (COZAAR) 100 MG tablet       vitamin D (CHOLECALCIFEROL) 1000 UNIT TABS tablet Take 1 tablet by mouth daily

## 2024-05-02 ENCOUNTER — OFFICE VISIT (OUTPATIENT)
Dept: DERMATOLOGY | Age: 84
End: 2024-05-02
Payer: MEDICARE

## 2024-05-02 DIAGNOSIS — D48.5 NEOPLASM OF UNCERTAIN BEHAVIOR OF SKIN: Primary | ICD-10-CM

## 2024-05-02 DIAGNOSIS — B07.9 VERRUCA VULGARIS: ICD-10-CM

## 2024-05-02 DIAGNOSIS — L57.0 AK (ACTINIC KERATOSIS): ICD-10-CM

## 2024-05-02 PROCEDURE — 1123F ACP DISCUSS/DSCN MKR DOCD: CPT | Performed by: DERMATOLOGY

## 2024-05-02 PROCEDURE — G8427 DOCREV CUR MEDS BY ELIG CLIN: HCPCS | Performed by: DERMATOLOGY

## 2024-05-02 PROCEDURE — 1036F TOBACCO NON-USER: CPT | Performed by: DERMATOLOGY

## 2024-05-02 PROCEDURE — 17000 DESTRUCT PREMALG LESION: CPT | Performed by: DERMATOLOGY

## 2024-05-02 PROCEDURE — 11102 TANGNTL BX SKIN SINGLE LES: CPT | Performed by: DERMATOLOGY

## 2024-05-02 PROCEDURE — 17003 DESTRUCT PREMALG LES 2-14: CPT | Performed by: DERMATOLOGY

## 2024-05-02 PROCEDURE — G8421 BMI NOT CALCULATED: HCPCS | Performed by: DERMATOLOGY

## 2024-05-02 PROCEDURE — 99212 OFFICE O/P EST SF 10 MIN: CPT | Performed by: DERMATOLOGY

## 2024-05-02 RX ORDER — LEVOTHYROXINE SODIUM 0.05 MG/1
50 TABLET ORAL DAILY
COMMUNITY
Start: 2024-04-10

## 2024-05-02 RX ORDER — METFORMIN HYDROCHLORIDE 500 MG/1
500 TABLET, EXTENDED RELEASE ORAL
COMMUNITY
Start: 2024-04-10

## 2024-05-02 RX ORDER — LOSARTAN POTASSIUM AND HYDROCHLOROTHIAZIDE 12.5; 1 MG/1; MG/1
1 TABLET ORAL DAILY
COMMUNITY
Start: 2024-04-10

## 2024-05-02 NOTE — PATIENT INSTRUCTIONS
Biopsy Wound Care Instructions    Keep the bandage in place for 24 hours.   Cleanse the wound with mild soapy water daily  Gently dry the area.  Apply Vaseline or petroleum jelly to the wound using a cotton tipped applicator.  Cover with a clean bandage.  Repeat this process until the biopsy site is healed.  If you had stitches placed, continue treating the site until the stitches are removed. Remember to make an appointment to return to have your stitches removed by our staff.  You may shower and bathe as usual.       ** Biopsy results generally take around 7 business days to come back.  If you have not heard from us by then, please call the office at [phone].    *Please note that biopsy results are released to both the patient and physician at the same time in [facility].  Please allow time for your physician to review the results.  One of our staff members will reach out to you with the results and plan.     Cryosurgery (Freezing) Wound Care Instructions    AFTER THE PROCEDURE:   You will notice swelling and redness around the site. This is normal.   You may experience a sharp or sore feeling for the next several days. For this discomfort, you may take acetaminophen (Tylenol©).   A blister may develop at the treated area, sometimes as soon as by the end of the day. After several days, the blister will subside and a scab will form.   If the area is bumped or traumatized during the first few days following freezing, you may develop bleeding into the blister, forming a blood blister. This is nothing to be alarmed about.  If the blister is tense, uncomfortable, or much larger than the site that was frozen, you may pop the blister along its edge with a sterile needle (boiled, heated under a flame, or cleaned with alcohol) to allow the fluid to drain out. If the blister does not bother you, no treatment is needed.   Do NOT peel off the top of the blister roof. It will act as a dressing on top of your wound.

## 2024-05-02 NOTE — PROGRESS NOTES
(KENALOG) 0.1 % cream Apply topically 2 times daily for up to 2 weeks or until improved. 80 g 0    Boswellia-Glucosamine-Vit D (GLUCOSAMINE COMPLEX PO) Take by mouth      metoprolol succinate (TOPROL XL) 50 MG extended release tablet Take 1 tablet by mouth      apixaban (ELIQUIS) 5 MG TABS tablet Take 1 tablet by mouth      atorvastatin (LIPITOR) 20 MG tablet       vitamin D (CHOLECALCIFEROL) 1000 UNIT TABS tablet Take 1 tablet by mouth daily           Physical Examination       Skin of the head, neck, trunk, upper extremities and lower extremities below the knee including the feet examined today.    Well-appearing.    1.  Left nasal tip with a 3 to 4 mm hyperkeratotic pink papule.    2.  Right distal nasal dorsum-1, right helix-2, left helix-2, left earlobe-1, left hand-2, right hand-4: Multiple keratotic erythematous macules.    3.  Left thumb tip and left distal medial index finger with 2 verruca yellowish papules with black dots.             Assessment and Plan     1. Neoplasm of uncertain behavior of skin, nasal tip-hypertrophic AK versus early SCC    Discussed possible diagnosis; patient agreeable to proceed with biopsy (written consent obtained).  Risks of the procedure were reviewed including discomfort, bleeding, scar and infection.      The area(s) to be biopsied were marked with a surgical pen.  Alcohol was used to cleanse the site. Local anesthesia was acheived with 1% lidocaine with epinephrine. Shave biopsy was performed using a razor blade.  Hemostasis was achieved with aluminum chloride. The wound(s) were dressed with petrolatum and covered with a bandage.  Wound care instructions were reviewed.  1 Specimen (s) sent to pathology.  The specimen bottles were appropriately labeled.      The patient tolerated the procedure well and there were no immediate complications.      2. AK (actinic keratosis) -     Cryotherapy was discussed and patient agreed to proceed.  Consent was obtained.  12 lesions were

## 2024-06-04 ENCOUNTER — OFFICE VISIT (OUTPATIENT)
Dept: DERMATOLOGY | Age: 84
End: 2024-06-04
Payer: MEDICARE

## 2024-06-04 DIAGNOSIS — L57.0 AK (ACTINIC KERATOSIS): Primary | ICD-10-CM

## 2024-06-04 DIAGNOSIS — L73.8 PSEUDOFOLLICULITIS: ICD-10-CM

## 2024-06-04 PROCEDURE — 17003 DESTRUCT PREMALG LES 2-14: CPT | Performed by: DERMATOLOGY

## 2024-06-04 PROCEDURE — 99212 OFFICE O/P EST SF 10 MIN: CPT | Performed by: DERMATOLOGY

## 2024-06-04 PROCEDURE — 1123F ACP DISCUSS/DSCN MKR DOCD: CPT | Performed by: DERMATOLOGY

## 2024-06-04 PROCEDURE — 17000 DESTRUCT PREMALG LESION: CPT | Performed by: DERMATOLOGY

## 2024-06-04 PROCEDURE — G8421 BMI NOT CALCULATED: HCPCS | Performed by: DERMATOLOGY

## 2024-06-04 PROCEDURE — G8427 DOCREV CUR MEDS BY ELIG CLIN: HCPCS | Performed by: DERMATOLOGY

## 2024-06-04 PROCEDURE — 1036F TOBACCO NON-USER: CPT | Performed by: DERMATOLOGY

## 2024-06-04 NOTE — PROGRESS NOTES
upper abdomen, 9/24/18  Dysplastic nevus with moderate dysplasia on the left flank     On Eliquis.      Review of Systems:  Gen: Feels well, good sense of health.      Past Medical History, Family History, Surgical History, Medications and Allergies reviewed.    Past Medical History:   Diagnosis Date    A-fib (HCC)     Hyperlipidemia     Hypertension      Past Surgical History:   Procedure Laterality Date    KNEE SURGERY         No Known Allergies  Outpatient Medications Marked as Taking for the 6/4/24 encounter (Office Visit) with Ramy Moya MD   Medication Sig Dispense Refill    levothyroxine (SYNTHROID) 50 MCG tablet Take 1 tablet by mouth daily      losartan-hydroCHLOROthiazide (HYZAAR) 100-12.5 MG per tablet Take 1 tablet by mouth daily      metFORMIN (GLUCOPHAGE-XR) 500 MG extended release tablet Take 1 tablet by mouth daily (with breakfast)      amiodarone (CORDARONE) 200 MG tablet Take 1 tablet by mouth daily      triamcinolone (KENALOG) 0.1 % cream Apply topically 2 times daily for up to 2 weeks or until improved. 80 g 0    Boswellia-Glucosamine-Vit D (GLUCOSAMINE COMPLEX PO) Take by mouth      metoprolol succinate (TOPROL XL) 50 MG extended release tablet Take 1 tablet by mouth      apixaban (ELIQUIS) 5 MG TABS tablet Take 1 tablet by mouth      atorvastatin (LIPITOR) 20 MG tablet       vitamin D (CHOLECALCIFEROL) 1000 UNIT TABS tablet Take 1 tablet by mouth daily             Physical Examination       Well appearing.    1.  Nasal tip with 2 keratotic pink macules bordering the biopsy site.     2.  Right anterior lateral neck with a small ill-defined pink nodule with central opening.        Assessment and Plan     1. AK (actinic keratosis) - 2    Cryotherapy was discussed and patient agreed to proceed.  Consent was obtained.  2 lesions were treated cryotherapy: nasal tip. 2 cycles of liquid nitrogen applied to each lesion for 5 seconds using a Cry-Ac cryo spray gun.  Patient was educated regarding

## 2024-11-12 NOTE — PROGRESS NOTES
[facility]  Ramy Moya MD  [phone]      Bryan Plummer  1940    84 y.o. male     Date of Visit: 11/13/2024    Chief Complaint: skin lesions    History of Present Illness:    1.  He has a persistent scaly lesion on the right cheek.    2.  He has multiple moles on the trunk and extremities-not aware of any changes in size, color, or shape.    3.  He reports several persistent painful warts on the left index finger and left thumb.  Did not clear with over-the-counter wart stick with 40% salicylic acid.    Dermatologic history:  Small nodular BCC on the right mid upper back-treated with electrodesiccation and curettage on 3/24/2021.  Superficial BCC on the central occipital scalp-treated with curettage on 11/1/2019.  Nodular BCC on the right distal extensor forearm-treated with curettage on 3/8/2017.  Nodular BCC left lateral neck-excised on 9/30/2016.     History of AK's-treated with Efudex on the left cheek/temple and ears and 2016.     Moderately dysplastic nevus - left upper abdomen, 9/24/18  Dysplastic nevus with moderate dysplasia on the left flank     On Eliquis.      Review of Systems:  Gen: Feels well, good sense of health.    Past Medical History, Family History, Surgical History, Medications and Allergies reviewed.    Past Medical History:   Diagnosis Date    A-fib (HCC)     Hyperlipidemia     Hypertension      Past Surgical History:   Procedure Laterality Date    KNEE SURGERY         No Known Allergies  Outpatient Medications Marked as Taking for the 11/13/24 encounter (Office Visit) with Ramy Moya MD   Medication Sig Dispense Refill    doxazosin (CARDURA) 2 MG tablet Take 1 tablet by mouth nightly      Multiple Vitamins-Minerals (THERAPEUTIC MULTIVITAMIN-MINERALS) tablet Take 1 tablet by mouth daily      losartan (COZAAR) 100 MG tablet Take 1 tablet by mouth daily      levothyroxine (SYNTHROID) 50 MCG tablet Take 1 tablet by mouth daily      metFORMIN

## 2024-11-13 ENCOUNTER — OFFICE VISIT (OUTPATIENT)
Dept: DERMATOLOGY | Age: 84
End: 2024-11-13
Payer: MEDICARE

## 2024-11-13 DIAGNOSIS — R20.9 DISTURBANCE OF SKIN SENSATION: ICD-10-CM

## 2024-11-13 DIAGNOSIS — B07.9 VERRUCA VULGARIS: ICD-10-CM

## 2024-11-13 DIAGNOSIS — L57.0 AK (ACTINIC KERATOSIS): Primary | ICD-10-CM

## 2024-11-13 DIAGNOSIS — D22.9 MULTIPLE NEVI: ICD-10-CM

## 2024-11-13 PROCEDURE — 99212 OFFICE O/P EST SF 10 MIN: CPT | Performed by: DERMATOLOGY

## 2024-11-13 PROCEDURE — 17000 DESTRUCT PREMALG LESION: CPT | Performed by: DERMATOLOGY

## 2024-11-13 PROCEDURE — 17110 DESTRUCTION B9 LES UP TO 14: CPT | Performed by: DERMATOLOGY

## 2024-11-13 RX ORDER — LOSARTAN POTASSIUM 100 MG/1
100 TABLET ORAL DAILY
COMMUNITY

## 2024-11-13 RX ORDER — DOXAZOSIN 2 MG/1
2 TABLET ORAL NIGHTLY
COMMUNITY
Start: 2024-11-05

## 2024-11-13 RX ORDER — M-VIT,TX,IRON,MINS/CALC/FOLIC 27MG-0.4MG
1 TABLET ORAL DAILY
COMMUNITY

## 2025-05-05 ENCOUNTER — OFFICE VISIT (OUTPATIENT)
Age: 85
End: 2025-05-05
Payer: MEDICARE

## 2025-05-05 DIAGNOSIS — L57.0 AK (ACTINIC KERATOSIS): ICD-10-CM

## 2025-05-05 DIAGNOSIS — D22.9 MULTIPLE NEVI: Primary | ICD-10-CM

## 2025-05-05 DIAGNOSIS — B07.9 VERRUCA VULGARIS: ICD-10-CM

## 2025-05-05 PROCEDURE — 17110 DESTRUCTION B9 LES UP TO 14: CPT | Performed by: DERMATOLOGY

## 2025-05-05 PROCEDURE — 99212 OFFICE O/P EST SF 10 MIN: CPT | Performed by: DERMATOLOGY

## 2025-05-05 NOTE — PROGRESS NOTES
[facility]  Ramy Moya MD  [phone]      Bryan Plummer  1940    84 y.o. male     Date of Visit: 5/5/2025    Chief Complaint: moles    History of Present Illness:    1.  He presents today for evaluation of multiple moles - not aware of any changes in size, color or shape.       2.  He has few asymptomatic scaly lesions on the face.     3.  Follow-up for warts of the left thumb and index finger - reduced in size with cryotherapy at last visit.  Still present.     Dermatologic history:  Small nodular BCC on the right mid upper back-treated with electrodesiccation and curettage on 3/24/2021.  Superficial BCC on the central occipital scalp-treated with curettage on 11/1/2019.  Nodular BCC on the right distal extensor forearm-treated with curettage on 3/8/2017.  Nodular BCC left lateral neck-excised on 9/30/2016.     History of AK's-treated with Efudex on the left cheek/temple and ears and 2016.     Moderately dysplastic nevus - left upper abdomen, 9/24/18  Dysplastic nevus with moderate dysplasia on the left flank    On Eliquis.      Review of Systems:  Gen: Feels well, good sense of health.    Past Medical History, Family History, Surgical History, Medications and Allergies reviewed.    Past Medical History:   Diagnosis Date    A-fib (HCC)     Hyperlipidemia     Hypertension      Past Surgical History:   Procedure Laterality Date    KNEE SURGERY         No Known Allergies  Outpatient Medications Marked as Taking for the 5/5/25 encounter (Office Visit) with Ramy Moya MD   Medication Sig Dispense Refill    doxazosin (CARDURA) 2 MG tablet Take 1 tablet by mouth nightly      Multiple Vitamins-Minerals (THERAPEUTIC MULTIVITAMIN-MINERALS) tablet Take 1 tablet by mouth daily      losartan (COZAAR) 100 MG tablet Take 1 tablet by mouth daily      levothyroxine (SYNTHROID) 50 MCG tablet Take 1 tablet by mouth daily      metFORMIN (GLUCOPHAGE-XR) 500 MG extended release tablet Take